# Patient Record
Sex: MALE | Race: WHITE | NOT HISPANIC OR LATINO
[De-identification: names, ages, dates, MRNs, and addresses within clinical notes are randomized per-mention and may not be internally consistent; named-entity substitution may affect disease eponyms.]

---

## 2022-06-30 PROBLEM — Z00.00 ENCOUNTER FOR PREVENTIVE HEALTH EXAMINATION: Status: ACTIVE | Noted: 2022-06-30

## 2022-07-16 ENCOUNTER — NON-APPOINTMENT (OUTPATIENT)
Age: 72
End: 2022-07-16

## 2022-07-19 ENCOUNTER — OUTPATIENT (OUTPATIENT)
Dept: OUTPATIENT SERVICES | Facility: HOSPITAL | Age: 72
LOS: 1 days | End: 2022-07-19
Payer: MEDICARE

## 2022-07-19 ENCOUNTER — APPOINTMENT (OUTPATIENT)
Dept: RADIOLOGY | Facility: CLINIC | Age: 72
End: 2022-07-19

## 2022-07-19 ENCOUNTER — APPOINTMENT (OUTPATIENT)
Dept: RHEUMATOLOGY | Facility: CLINIC | Age: 72
End: 2022-07-19
Payer: MEDICARE

## 2022-07-19 ENCOUNTER — RESULT REVIEW (OUTPATIENT)
Age: 72
End: 2022-07-19

## 2022-07-19 ENCOUNTER — LABORATORY RESULT (OUTPATIENT)
Age: 72
End: 2022-07-19

## 2022-07-19 ENCOUNTER — TRANSCRIPTION ENCOUNTER (OUTPATIENT)
Age: 72
End: 2022-07-19

## 2022-07-19 VITALS
SYSTOLIC BLOOD PRESSURE: 150 MMHG | TEMPERATURE: 97.7 F | OXYGEN SATURATION: 98 % | DIASTOLIC BLOOD PRESSURE: 81 MMHG | RESPIRATION RATE: 16 BRPM | HEART RATE: 90 BPM | WEIGHT: 168 LBS

## 2022-07-19 DIAGNOSIS — M16.11 UNILATERAL PRIMARY OSTEOARTHRITIS, RIGHT HIP: ICD-10-CM

## 2022-07-19 DIAGNOSIS — K30 FUNCTIONAL DYSPEPSIA: ICD-10-CM

## 2022-07-19 DIAGNOSIS — R21 RASH AND OTHER NONSPECIFIC SKIN ERUPTION: ICD-10-CM

## 2022-07-19 DIAGNOSIS — Z87.891 PERSONAL HISTORY OF NICOTINE DEPENDENCE: ICD-10-CM

## 2022-07-19 DIAGNOSIS — R63.4 ABNORMAL WEIGHT LOSS: ICD-10-CM

## 2022-07-19 PROCEDURE — 73502 X-RAY EXAM HIP UNI 2-3 VIEWS: CPT

## 2022-07-19 PROCEDURE — 73502 X-RAY EXAM HIP UNI 2-3 VIEWS: CPT | Mod: 26,RT

## 2022-07-19 PROCEDURE — 71045 X-RAY EXAM CHEST 1 VIEW: CPT

## 2022-07-19 PROCEDURE — 71045 X-RAY EXAM CHEST 1 VIEW: CPT | Mod: 26

## 2022-07-19 PROCEDURE — 99205 OFFICE O/P NEW HI 60 MIN: CPT

## 2022-07-20 ENCOUNTER — NON-APPOINTMENT (OUTPATIENT)
Age: 72
End: 2022-07-20

## 2022-07-20 PROBLEM — Z87.891 FORMER SMOKER: Status: ACTIVE | Noted: 2022-07-20

## 2022-07-20 PROBLEM — M16.11 ARTHRITIS OF RIGHT HIP: Status: ACTIVE | Noted: 2022-07-19

## 2022-07-20 PROBLEM — R21 RASH: Status: ACTIVE | Noted: 2022-07-19

## 2022-07-20 RX ORDER — ACYCLOVIR 400 MG/1
400 TABLET ORAL
Qty: 45 | Refills: 0 | Status: COMPLETED | COMMUNITY
Start: 2022-06-17

## 2022-07-20 RX ORDER — AMLODIPINE BESYLATE 5 MG/1
5 TABLET ORAL
Qty: 30 | Refills: 0 | Status: ACTIVE | COMMUNITY
Start: 2022-03-02

## 2022-07-21 ENCOUNTER — OUTPATIENT (OUTPATIENT)
Dept: OUTPATIENT SERVICES | Facility: HOSPITAL | Age: 72
LOS: 1 days | End: 2022-07-21
Payer: MEDICARE

## 2022-07-21 ENCOUNTER — APPOINTMENT (OUTPATIENT)
Dept: ULTRASOUND IMAGING | Facility: CLINIC | Age: 72
End: 2022-07-21

## 2022-07-21 DIAGNOSIS — R63.4 ABNORMAL WEIGHT LOSS: ICD-10-CM

## 2022-07-21 DIAGNOSIS — R14.0 ABDOMINAL DISTENSION (GASEOUS): ICD-10-CM

## 2022-07-21 LAB
ALBUMIN SERPL ELPH-MCNC: 4.8 G/DL
ALP BLD-CCNC: 56 U/L
ALT SERPL-CCNC: 12 U/L
AMYLASE/CREAT SERPL: 72 U/L
ANION GAP SERPL CALC-SCNC: 13 MMOL/L
APPEARANCE: CLEAR
AST SERPL-CCNC: 15 U/L
BACTERIA: NEGATIVE
BASOPHILS # BLD AUTO: 0.06 K/UL
BASOPHILS NFR BLD AUTO: 0.9 %
BILIRUB SERPL-MCNC: 0.5 MG/DL
BILIRUBIN URINE: NEGATIVE
BLOOD URINE: NEGATIVE
BUN SERPL-MCNC: 11 MG/DL
CALCIUM SERPL-MCNC: 9.4 MG/DL
CHLORIDE SERPL-SCNC: 104 MMOL/L
CO2 SERPL-SCNC: 25 MMOL/L
COLOR: NORMAL
CREAT SERPL-MCNC: 1.05 MG/DL
CREAT SPEC-SCNC: 65 MG/DL
CREAT/PROT UR: 0.1 RATIO
CRP SERPL-MCNC: <3 MG/L
DEPRECATED KAPPA LC FREE/LAMBDA SER: 1.08 RATIO
EGFR: 76 ML/MIN/1.73M2
EOSINOPHIL # BLD AUTO: 0.03 K/UL
EOSINOPHIL NFR BLD AUTO: 0.4 %
ERYTHROCYTE [SEDIMENTATION RATE] IN BLOOD BY WESTERGREN METHOD: 2 MM/HR
GLUCOSE QUALITATIVE U: NEGATIVE
GLUCOSE SERPL-MCNC: 97 MG/DL
HCT VFR BLD CALC: 43.7 %
HGB BLD-MCNC: 14.1 G/DL
HYALINE CASTS: 0 /LPF
IGA SER QL IEP: 171 MG/DL
IGG SER QL IEP: 902 MG/DL
IGM SER QL IEP: 21 MG/DL
IMM GRANULOCYTES NFR BLD AUTO: 0.3 %
KAPPA LC CSF-MCNC: 1.31 MG/DL
KAPPA LC SERPL-MCNC: 1.42 MG/DL
KETONES URINE: NEGATIVE
LEUKOCYTE ESTERASE URINE: NEGATIVE
LPL SERPL-CCNC: 24 U/L
LYMPHOCYTES # BLD AUTO: 1.77 K/UL
LYMPHOCYTES NFR BLD AUTO: 26.4 %
MAN DIFF?: NORMAL
MCHC RBC-ENTMCNC: 28.7 PG
MCHC RBC-ENTMCNC: 32.3 GM/DL
MCV RBC AUTO: 89 FL
MICROSCOPIC-UA: NORMAL
MONOCYTES # BLD AUTO: 0.49 K/UL
MONOCYTES NFR BLD AUTO: 7.3 %
NEUTROPHILS # BLD AUTO: 4.33 K/UL
NEUTROPHILS NFR BLD AUTO: 64.7 %
NITRITE URINE: NEGATIVE
PH URINE: 5.5
PLATELET # BLD AUTO: 279 K/UL
POTASSIUM SERPL-SCNC: 4.2 MMOL/L
PROT SERPL-MCNC: 6.9 G/DL
PROT UR-MCNC: 4 MG/DL
PROTEIN URINE: NEGATIVE
RBC # BLD: 4.91 M/UL
RBC # FLD: 13.2 %
RED BLOOD CELLS URINE: 0 /HPF
SODIUM SERPL-SCNC: 141 MMOL/L
SPECIFIC GRAVITY URINE: 1.01
SQUAMOUS EPITHELIAL CELLS: 0 /HPF
UROBILINOGEN URINE: NORMAL
WBC # FLD AUTO: 6.7 K/UL
WHITE BLOOD CELLS URINE: 0 /HPF

## 2022-07-21 PROCEDURE — 76700 US EXAM ABDOM COMPLETE: CPT

## 2022-07-21 PROCEDURE — 76700 US EXAM ABDOM COMPLETE: CPT | Mod: 26

## 2022-07-22 PROBLEM — K30: Status: ACTIVE | Noted: 2022-07-22

## 2022-07-26 ENCOUNTER — APPOINTMENT (OUTPATIENT)
Dept: GASTROENTEROLOGY | Facility: CLINIC | Age: 72
End: 2022-07-26

## 2022-07-26 VITALS
TEMPERATURE: 96.9 F | WEIGHT: 158 LBS | HEART RATE: 76 BPM | SYSTOLIC BLOOD PRESSURE: 140 MMHG | DIASTOLIC BLOOD PRESSURE: 76 MMHG | OXYGEN SATURATION: 98 % | BODY MASS INDEX: 23.4 KG/M2 | HEIGHT: 69 IN

## 2022-07-26 DIAGNOSIS — Z80.52 FAMILY HISTORY OF MALIGNANT NEOPLASM OF BLADDER: ICD-10-CM

## 2022-07-26 DIAGNOSIS — I10 ESSENTIAL (PRIMARY) HYPERTENSION: ICD-10-CM

## 2022-07-26 DIAGNOSIS — R10.32 LEFT LOWER QUADRANT PAIN: ICD-10-CM

## 2022-07-26 DIAGNOSIS — K59.00 CONSTIPATION, UNSPECIFIED: ICD-10-CM

## 2022-07-26 PROCEDURE — 99214 OFFICE O/P EST MOD 30 MIN: CPT

## 2022-07-26 NOTE — REVIEW OF SYSTEMS
[Feeling Tired] : feeling tired [Recent Weight Loss (___ Lbs)] : recent [unfilled] ~Ulb weight loss [Negative] : Heme/Lymph

## 2022-07-26 NOTE — REASON FOR VISIT
[FreeTextEntry1] : Anorexia, 10 pound weight loss in a week, lower pelvic and inguinal discomfort some mild constipation

## 2022-07-26 NOTE — CONSULT LETTER
[Dear  ___] : Dear  [unfilled], [Consult Letter:] : I had the pleasure of evaluating your patient, [unfilled]. [Please see my note below.] : Please see my note below. [Consult Closing:] : Thank you very much for allowing me to participate in the care of this patient.  If you have any questions, please do not hesitate to contact me. [Sincerely,] : Sincerely, [FreeTextEntry2] : Angie Weems MD\par [address] [place] [address] [address] [FreeTextEntry3] : Que Rodriguez MD\par

## 2022-07-26 NOTE — ASSESSMENT
[FreeTextEntry1] : Impression\par \par Significant anorexia 1 week\par \par 10 pound weight loss 1 week\par \par Lower abdominal wall really pelvic bilateral discomfort intermittently into the left flank 1 week\par \par No recent upper endoscopy or colonoscopy\par \par Ultrasound shows small hepatic cyst and gallbladder polyp\par \par CAT scan with contrast as an angiogram ordered by rheumatologist and to be done in 2 days\par \par Suggest\par \par Agree with CAT scan\par \par Blood work here today\par \par Upper endoscopy soon as possible\par \par Later colonoscopy\par \par MiraLAX\par \par He has my cell phone number and he can call me anytime we will go to emergency room

## 2022-07-26 NOTE — PHYSICAL EXAM
[General Appearance - Alert] : alert [General Appearance - In No Acute Distress] : in no acute distress [FreeTextEntry1] : Thin pleasant gentleman, no acute distress [Sclera] : the sclera and conjunctiva were normal [Neck Appearance] : the appearance of the neck was normal [Neck Cervical Mass (___cm)] : no neck mass was observed [Jugular Venous Distention Increased] : there was no jugular-venous distention [Auscultation Breath Sounds / Voice Sounds] : lungs were clear to auscultation bilaterally [Apical Impulse] : the apical impulse was normal [Full Pulse] : the pedal pulses are present [Edema] : there was no peripheral edema [Bowel Sounds] : normal bowel sounds [Abdomen Soft] : soft [Abdomen Tenderness] : non-tender [] : no hepato-splenomegaly [Abdomen Mass (___ Cm)] : no abdominal mass palpated [Cervical Lymph Nodes Enlarged Posterior Bilaterally] : posterior cervical [Cervical Lymph Nodes Enlarged Anterior Bilaterally] : anterior cervical [Supraclavicular Lymph Nodes Enlarged Bilaterally] : supraclavicular [Axillary Lymph Nodes Enlarged Bilaterally] : axillary [Femoral Lymph Nodes Enlarged Bilaterally] : femoral [Inguinal Lymph Nodes Enlarged Bilaterally] : inguinal [No CVA Tenderness] : no ~M costovertebral angle tenderness [No Spinal Tenderness] : no spinal tenderness [Abnormal Walk] : normal gait [Nail Clubbing] : no clubbing  or cyanosis of the fingernails [Musculoskeletal - Swelling] : no joint swelling seen [Motor Tone] : muscle strength and tone were normal [Skin Color & Pigmentation] : normal skin color and pigmentation [Skin Turgor] : normal skin turgor [No Focal Deficits] : no focal deficits [Oriented To Time, Place, And Person] : oriented to person, place, and time [Impaired Insight] : insight and judgment were intact [Affect] : the affect was normal

## 2022-07-26 NOTE — HISTORY OF PRESENT ILLNESS
[de-identified] : Angie Weems MD\par [address] [place] [address] [address]\par \par Pleasant 71-year-old gentleman\par \par 1 week significant anorexia with 10 pound weight loss\par \par 1 week of intermittent abdominal really pelvic discomfort, bilaterally, into the inguinal area and occasionally into the left flank area\par \par No fever or chills\par \par Some mild constipation\par \par No blood per rectum\par \par No vomiting\par \par No recent upper endoscopy or colonoscopy\par \par Ultrasound shows hepatic cyst and gallbladder polyp\par \par CAT scan of the abdomen with contrast is scheduled in 2 days\par \par No fever or chills\par \par No family history of any intestinal, hepatobiliary or pancreatic neoplasms

## 2022-07-27 DIAGNOSIS — D50.8 OTHER IRON DEFICIENCY ANEMIAS: ICD-10-CM

## 2022-07-27 LAB
ALBUMIN SERPL ELPH-MCNC: 5 G/DL
ALP BLD-CCNC: 57 U/L
ALT SERPL-CCNC: 12 U/L
ANION GAP SERPL CALC-SCNC: 17 MMOL/L
AST SERPL-CCNC: 21 U/L
BASOPHILS # BLD AUTO: 0.05 K/UL
BASOPHILS NFR BLD AUTO: 0.7 %
BILIRUB SERPL-MCNC: 0.8 MG/DL
BUN SERPL-MCNC: 12 MG/DL
CALCIUM SERPL-MCNC: 10 MG/DL
CANCER AG19-9 SERPL-ACNC: 18 U/ML
CEA SERPL-MCNC: 1.7 NG/ML
CHLORIDE SERPL-SCNC: 101 MMOL/L
CO2 SERPL-SCNC: 22 MMOL/L
CREAT SERPL-MCNC: 1.01 MG/DL
CRP SERPL-MCNC: <3 MG/L
EGFR: 80 ML/MIN/1.73M2
EOSINOPHIL # BLD AUTO: 0.02 K/UL
EOSINOPHIL NFR BLD AUTO: 0.3 %
ERYTHROCYTE [SEDIMENTATION RATE] IN BLOOD BY WESTERGREN METHOD: 6 MM/HR
FERRITIN SERPL-MCNC: 233 NG/ML
GLUCOSE SERPL-MCNC: 86 MG/DL
HCT VFR BLD CALC: 45.9 %
HGB BLD-MCNC: 14.9 G/DL
IMM GRANULOCYTES NFR BLD AUTO: 0.1 %
IRON SATN MFR SERPL: 13 %
IRON SERPL-MCNC: 47 UG/DL
LPL SERPL-CCNC: 16 U/L
LYMPHOCYTES # BLD AUTO: 1.68 K/UL
LYMPHOCYTES NFR BLD AUTO: 25 %
MAN DIFF?: NORMAL
MCHC RBC-ENTMCNC: 28.9 PG
MCHC RBC-ENTMCNC: 32.5 GM/DL
MCV RBC AUTO: 89.1 FL
MONOCYTES # BLD AUTO: 0.45 K/UL
MONOCYTES NFR BLD AUTO: 6.7 %
NEUTROPHILS # BLD AUTO: 4.5 K/UL
NEUTROPHILS NFR BLD AUTO: 67.2 %
PLATELET # BLD AUTO: 287 K/UL
POTASSIUM SERPL-SCNC: 4.4 MMOL/L
PROT SERPL-MCNC: 7.2 G/DL
RBC # BLD: 5.15 M/UL
RBC # FLD: 13.2 %
SODIUM SERPL-SCNC: 139 MMOL/L
TIBC SERPL-MCNC: 357 UG/DL
UIBC SERPL-MCNC: 309 UG/DL
WBC # FLD AUTO: 6.71 K/UL

## 2022-07-28 ENCOUNTER — NON-APPOINTMENT (OUTPATIENT)
Age: 72
End: 2022-07-28

## 2022-07-28 ENCOUNTER — APPOINTMENT (OUTPATIENT)
Dept: CT IMAGING | Facility: IMAGING CENTER | Age: 72
End: 2022-07-28

## 2022-07-28 ENCOUNTER — OUTPATIENT (OUTPATIENT)
Dept: OUTPATIENT SERVICES | Facility: HOSPITAL | Age: 72
LOS: 1 days | End: 2022-07-28
Payer: MEDICARE

## 2022-07-28 DIAGNOSIS — K30 FUNCTIONAL DYSPEPSIA: ICD-10-CM

## 2022-07-28 DIAGNOSIS — R14.0 ABDOMINAL DISTENSION (GASEOUS): ICD-10-CM

## 2022-07-28 DIAGNOSIS — Z00.8 ENCOUNTER FOR OTHER GENERAL EXAMINATION: ICD-10-CM

## 2022-07-28 PROCEDURE — 74175 CTA ABDOMEN W/CONTRAST: CPT | Mod: 26

## 2022-07-28 PROCEDURE — 74175 CTA ABDOMEN W/CONTRAST: CPT

## 2022-07-30 ENCOUNTER — NON-APPOINTMENT (OUTPATIENT)
Age: 72
End: 2022-07-30

## 2022-08-01 ENCOUNTER — APPOINTMENT (OUTPATIENT)
Dept: GASTROENTEROLOGY | Facility: AMBULATORY MEDICAL SERVICES | Age: 72
End: 2022-08-01

## 2022-08-02 ENCOUNTER — NON-APPOINTMENT (OUTPATIENT)
Age: 72
End: 2022-08-02

## 2022-08-03 ENCOUNTER — APPOINTMENT (OUTPATIENT)
Dept: GASTROENTEROLOGY | Facility: CLINIC | Age: 72
End: 2022-08-03

## 2022-08-03 ENCOUNTER — APPOINTMENT (OUTPATIENT)
Dept: GASTROENTEROLOGY | Facility: CLINIC | Age: 72
End: 2022-08-03
Payer: MEDICARE

## 2022-08-03 VITALS
HEIGHT: 69 IN | HEART RATE: 77 BPM | DIASTOLIC BLOOD PRESSURE: 75 MMHG | TEMPERATURE: 97.7 F | OXYGEN SATURATION: 98 % | SYSTOLIC BLOOD PRESSURE: 132 MMHG | WEIGHT: 157.2 LBS | BODY MASS INDEX: 23.28 KG/M2

## 2022-08-03 DIAGNOSIS — Z20.822 ENCOUNTER FOR PREPROCEDURAL LABORATORY EXAMINATION: ICD-10-CM

## 2022-08-03 DIAGNOSIS — Z01.812 ENCOUNTER FOR PREPROCEDURAL LABORATORY EXAMINATION: ICD-10-CM

## 2022-08-03 DIAGNOSIS — R93.5 ABNORMAL FINDINGS ON DIAGNOSTIC IMAGING OF OTHER ABDOMINAL REGIONS, INCLUDING RETROPERITONEUM: ICD-10-CM

## 2022-08-03 DIAGNOSIS — R14.0 ABDOMINAL DISTENSION (GASEOUS): ICD-10-CM

## 2022-08-03 DIAGNOSIS — R10.9 UNSPECIFIED ABDOMINAL PAIN: ICD-10-CM

## 2022-08-03 DIAGNOSIS — R63.4 ABNORMAL WEIGHT LOSS: ICD-10-CM

## 2022-08-03 DIAGNOSIS — R63.0 ANOREXIA: ICD-10-CM

## 2022-08-03 DIAGNOSIS — Z86.79 PERSONAL HISTORY OF OTHER DISEASES OF THE CIRCULATORY SYSTEM: ICD-10-CM

## 2022-08-03 DIAGNOSIS — K59.00 CONSTIPATION, UNSPECIFIED: ICD-10-CM

## 2022-08-03 PROCEDURE — 36415 COLL VENOUS BLD VENIPUNCTURE: CPT

## 2022-08-03 PROCEDURE — 99215 OFFICE O/P EST HI 40 MIN: CPT | Mod: 25

## 2022-08-03 RX ORDER — MULTIVIT-MIN/FOLIC/VIT K/LYCOP 400-300MCG
1000 TABLET ORAL
Refills: 0 | Status: ACTIVE | COMMUNITY

## 2022-08-03 RX ORDER — ESCITALOPRAM OXALATE 5 MG/1
5 TABLET ORAL
Qty: 30 | Refills: 0 | Status: DISCONTINUED | COMMUNITY
Start: 2022-06-29 | End: 2022-08-03

## 2022-08-03 RX ORDER — HYDROCORTISONE 25 MG/G
2.5 CREAM TOPICAL
Qty: 30 | Refills: 0 | Status: DISCONTINUED | COMMUNITY
Start: 2022-07-11 | End: 2022-08-03

## 2022-08-03 RX ORDER — TRIAMCINOLONE ACETONIDE 1 MG/G
0.1 OINTMENT TOPICAL
Qty: 80 | Refills: 0 | Status: DISCONTINUED | COMMUNITY
Start: 2022-07-13 | End: 2022-08-03

## 2022-08-03 RX ORDER — SODIUM SULFATE, POTASSIUM SULFATE, MAGNESIUM SULFATE 17.5; 3.13; 1.6 G/ML; G/ML; G/ML
17.5-3.13-1.6 SOLUTION, CONCENTRATE ORAL
Qty: 1 | Refills: 0 | Status: DISCONTINUED | COMMUNITY
Start: 2022-07-27 | End: 2022-08-03

## 2022-08-03 RX ORDER — HYDROXYZINE HYDROCHLORIDE 25 MG/1
25 TABLET ORAL
Qty: 30 | Refills: 0 | Status: DISCONTINUED | COMMUNITY
Start: 2022-03-02 | End: 2022-08-03

## 2022-08-04 ENCOUNTER — APPOINTMENT (OUTPATIENT)
Dept: VASCULAR SURGERY | Facility: CLINIC | Age: 72
End: 2022-08-04

## 2022-08-04 VITALS
SYSTOLIC BLOOD PRESSURE: 117 MMHG | BODY MASS INDEX: 23.25 KG/M2 | WEIGHT: 157 LBS | DIASTOLIC BLOOD PRESSURE: 78 MMHG | HEIGHT: 69 IN | HEART RATE: 71 BPM

## 2022-08-04 PROCEDURE — 99204 OFFICE O/P NEW MOD 45 MIN: CPT

## 2022-08-04 NOTE — ASSESSMENT
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  The DICK is small and difficult to evaluate.  The SMA is without stenosis.  At this time I doubt ischemia as a cause of the patient's symptoms.  Patient should continue and follow through with his endoscopy and colonoscopy.  He may follow-up as needed.

## 2022-08-04 NOTE — HISTORY OF PRESENT ILLNESS
[FreeTextEntry1] : 71-year-old gentleman with no significant medical history presents to the office complaining of intermittent abdominal pain.  Patient states several months ago he developed a rash around the ankles.  The presumed diagnosis was vasculitis and notes that it has now resolved.  In addition.  Patient began complaining of anorexia along with weight loss.  He states that the pain is in his abdomen and radiates to the flanks and groins.  Patient has not been vomiting.  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  He presents for evaluation.

## 2022-08-04 NOTE — PHYSICAL EXAM
[Normal Breath Sounds] : Normal breath sounds [Normal Rate and Rhythm] : normal rate and rhythm [2+] : left 2+ [Varicose Veins Of Lower Extremities] : present [Varicose Veins Of The Right Leg] : of the right leg [Ankle Swelling On The Left] : moderate [No Rash or Lesion] : No rash or lesion [Alert] : alert [Calm] : calm [JVD] : no jugular venous distention  [Abdomen Tenderness] : ~T ~M No abdominal tenderness [de-identified] : Appears well

## 2022-08-06 ENCOUNTER — EMERGENCY (EMERGENCY)
Facility: HOSPITAL | Age: 72
LOS: 1 days | Discharge: HOME | End: 2022-08-06
Attending: STUDENT IN AN ORGANIZED HEALTH CARE EDUCATION/TRAINING PROGRAM | Admitting: STUDENT IN AN ORGANIZED HEALTH CARE EDUCATION/TRAINING PROGRAM
Payer: MEDICARE

## 2022-08-06 ENCOUNTER — NON-APPOINTMENT (OUTPATIENT)
Age: 72
End: 2022-08-06

## 2022-08-06 VITALS
RESPIRATION RATE: 18 BRPM | DIASTOLIC BLOOD PRESSURE: 71 MMHG | SYSTOLIC BLOOD PRESSURE: 136 MMHG | WEIGHT: 154.98 LBS | TEMPERATURE: 98 F | OXYGEN SATURATION: 98 % | HEIGHT: 69 IN | HEART RATE: 79 BPM

## 2022-08-06 DIAGNOSIS — D69.0 ALLERGIC PURPURA: ICD-10-CM

## 2022-08-06 DIAGNOSIS — R10.30 LOWER ABDOMINAL PAIN, UNSPECIFIED: ICD-10-CM

## 2022-08-06 LAB
ALBUMIN SERPL ELPH-MCNC: 4.6 G/DL — SIGNIFICANT CHANGE UP (ref 3.3–5)
ALP SERPL-CCNC: 49 U/L — SIGNIFICANT CHANGE UP (ref 40–120)
ALT FLD-CCNC: 12 U/L — SIGNIFICANT CHANGE UP (ref 10–45)
ANION GAP SERPL CALC-SCNC: 10 MMOL/L — SIGNIFICANT CHANGE UP (ref 5–17)
APPEARANCE UR: CLEAR — SIGNIFICANT CHANGE UP
APTT BLD: 35.9 SEC — HIGH (ref 27.5–35.5)
AST SERPL-CCNC: 17 U/L — SIGNIFICANT CHANGE UP (ref 10–40)
BACTERIA # UR AUTO: SIGNIFICANT CHANGE UP /HPF
BASOPHILS # BLD AUTO: 0.05 K/UL — SIGNIFICANT CHANGE UP (ref 0–0.2)
BASOPHILS NFR BLD AUTO: 0.8 % — SIGNIFICANT CHANGE UP (ref 0–2)
BILIRUB SERPL-MCNC: 0.7 MG/DL — SIGNIFICANT CHANGE UP (ref 0.2–1.2)
BILIRUB UR-MCNC: NEGATIVE — SIGNIFICANT CHANGE UP
BLD GP AB SCN SERPL QL: NEGATIVE — SIGNIFICANT CHANGE UP
BUN SERPL-MCNC: 8 MG/DL — SIGNIFICANT CHANGE UP (ref 7–23)
CALCIUM SERPL-MCNC: 9.6 MG/DL — SIGNIFICANT CHANGE UP (ref 8.4–10.5)
CHLORIDE SERPL-SCNC: 102 MMOL/L — SIGNIFICANT CHANGE UP (ref 96–108)
CO2 SERPL-SCNC: 27 MMOL/L — SIGNIFICANT CHANGE UP (ref 22–31)
COLOR SPEC: YELLOW — SIGNIFICANT CHANGE UP
COMMENT - URINE: SIGNIFICANT CHANGE UP
CREAT SERPL-MCNC: 0.89 MG/DL — SIGNIFICANT CHANGE UP (ref 0.5–1.3)
CRP SERPL-MCNC: <3 MG/L — SIGNIFICANT CHANGE UP (ref 0–4)
DIFF PNL FLD: ABNORMAL
EGFR: 92 ML/MIN/1.73M2 — SIGNIFICANT CHANGE UP
EOSINOPHIL # BLD AUTO: 0.04 K/UL — SIGNIFICANT CHANGE UP (ref 0–0.5)
EOSINOPHIL NFR BLD AUTO: 0.7 % — SIGNIFICANT CHANGE UP (ref 0–6)
EPI CELLS # UR: SIGNIFICANT CHANGE UP /HPF (ref 0–5)
ERYTHROCYTE [SEDIMENTATION RATE] IN BLOOD: 5 MM/HR — SIGNIFICANT CHANGE UP
FIBRINOGEN PPP-MCNC: 357 MG/DL — SIGNIFICANT CHANGE UP (ref 258–438)
GLUCOSE SERPL-MCNC: 96 MG/DL — SIGNIFICANT CHANGE UP (ref 70–99)
GLUCOSE UR QL: NEGATIVE — SIGNIFICANT CHANGE UP
HCT VFR BLD CALC: 42.6 % — SIGNIFICANT CHANGE UP (ref 39–50)
HGB BLD-MCNC: 14.3 G/DL — SIGNIFICANT CHANGE UP (ref 13–17)
HYALINE CASTS # UR AUTO: SIGNIFICANT CHANGE UP /LPF (ref 0–2)
IMM GRANULOCYTES NFR BLD AUTO: 0.2 % — SIGNIFICANT CHANGE UP (ref 0–1.5)
INR BLD: 1.02 — SIGNIFICANT CHANGE UP (ref 0.88–1.16)
KETONES UR-MCNC: 15 MG/DL
LEUKOCYTE ESTERASE UR-ACNC: NEGATIVE — SIGNIFICANT CHANGE UP
LYMPHOCYTES # BLD AUTO: 1.65 K/UL — SIGNIFICANT CHANGE UP (ref 1–3.3)
LYMPHOCYTES # BLD AUTO: 27 % — SIGNIFICANT CHANGE UP (ref 13–44)
MCHC RBC-ENTMCNC: 28.1 PG — SIGNIFICANT CHANGE UP (ref 27–34)
MCHC RBC-ENTMCNC: 33.6 GM/DL — SIGNIFICANT CHANGE UP (ref 32–36)
MCV RBC AUTO: 83.9 FL — SIGNIFICANT CHANGE UP (ref 80–100)
MONOCYTES # BLD AUTO: 0.4 K/UL — SIGNIFICANT CHANGE UP (ref 0–0.9)
MONOCYTES NFR BLD AUTO: 6.5 % — SIGNIFICANT CHANGE UP (ref 2–14)
NEUTROPHILS # BLD AUTO: 3.97 K/UL — SIGNIFICANT CHANGE UP (ref 1.8–7.4)
NEUTROPHILS NFR BLD AUTO: 64.8 % — SIGNIFICANT CHANGE UP (ref 43–77)
NITRITE UR-MCNC: NEGATIVE — SIGNIFICANT CHANGE UP
NRBC # BLD: 0 /100 WBCS — SIGNIFICANT CHANGE UP (ref 0–0)
PH UR: 5.5 — SIGNIFICANT CHANGE UP (ref 5–8)
PLATELET # BLD AUTO: 247 K/UL — SIGNIFICANT CHANGE UP (ref 150–400)
POTASSIUM SERPL-MCNC: 3.8 MMOL/L — SIGNIFICANT CHANGE UP (ref 3.5–5.3)
POTASSIUM SERPL-SCNC: 3.8 MMOL/L — SIGNIFICANT CHANGE UP (ref 3.5–5.3)
PROT SERPL-MCNC: 6.9 G/DL — SIGNIFICANT CHANGE UP (ref 6–8.3)
PROT UR-MCNC: NEGATIVE MG/DL — SIGNIFICANT CHANGE UP
PROTHROM AB SERPL-ACNC: 12.1 SEC — SIGNIFICANT CHANGE UP (ref 10.5–13.4)
RBC # BLD: 5.08 M/UL — SIGNIFICANT CHANGE UP (ref 4.2–5.8)
RBC # FLD: 13 % — SIGNIFICANT CHANGE UP (ref 10.3–14.5)
RBC CASTS # UR COMP ASSIST: < 5 /HPF — SIGNIFICANT CHANGE UP
RH IG SCN BLD-IMP: NEGATIVE — SIGNIFICANT CHANGE UP
SODIUM SERPL-SCNC: 139 MMOL/L — SIGNIFICANT CHANGE UP (ref 135–145)
SP GR SPEC: 1.02 — SIGNIFICANT CHANGE UP (ref 1–1.03)
UROBILINOGEN FLD QL: 0.2 E.U./DL — SIGNIFICANT CHANGE UP
WBC # BLD: 6.12 K/UL — SIGNIFICANT CHANGE UP (ref 3.8–10.5)
WBC # FLD AUTO: 6.12 K/UL — SIGNIFICANT CHANGE UP (ref 3.8–10.5)
WBC UR QL: < 5 /HPF — SIGNIFICANT CHANGE UP

## 2022-08-06 PROCEDURE — 85379 FIBRIN DEGRADATION QUANT: CPT

## 2022-08-06 PROCEDURE — 36415 COLL VENOUS BLD VENIPUNCTURE: CPT

## 2022-08-06 PROCEDURE — 86140 C-REACTIVE PROTEIN: CPT

## 2022-08-06 PROCEDURE — 99284 EMERGENCY DEPT VISIT MOD MDM: CPT

## 2022-08-06 PROCEDURE — 80053 COMPREHEN METABOLIC PANEL: CPT

## 2022-08-06 PROCEDURE — 86850 RBC ANTIBODY SCREEN: CPT

## 2022-08-06 PROCEDURE — 81001 URINALYSIS AUTO W/SCOPE: CPT

## 2022-08-06 PROCEDURE — 85730 THROMBOPLASTIN TIME PARTIAL: CPT

## 2022-08-06 PROCEDURE — 99283 EMERGENCY DEPT VISIT LOW MDM: CPT

## 2022-08-06 PROCEDURE — 86900 BLOOD TYPING SEROLOGIC ABO: CPT

## 2022-08-06 PROCEDURE — 85384 FIBRINOGEN ACTIVITY: CPT

## 2022-08-06 PROCEDURE — 85652 RBC SED RATE AUTOMATED: CPT

## 2022-08-06 PROCEDURE — 85610 PROTHROMBIN TIME: CPT

## 2022-08-06 PROCEDURE — 86901 BLOOD TYPING SEROLOGIC RH(D): CPT

## 2022-08-06 PROCEDURE — 85025 COMPLETE CBC W/AUTO DIFF WBC: CPT

## 2022-08-06 NOTE — ED ADULT TRIAGE NOTE - CHIEF COMPLAINT QUOTE
BIBEMS co R arm bruising beginning this morning. Reports he had vasculitis 1 month ago. Denies injury or trauma, numbness, tingling. takes 81mg ASA qd.

## 2022-08-06 NOTE — ED PROVIDER NOTE - CLINICAL SUMMARY MEDICAL DECISION MAKING FREE TEXT BOX
Atraumatic palpable purpuric lesion on forearm for 1 day. No systemic symptoms indicating severe vasculitis. Pt takes aspirin, no AC.  Will w/u for vasculidities  HDS, well-appearing, no signs or symptoms of hemorrhage  Labs: CBC, CMP, PT/PTT, ESR, CRP, D-dimer, fibrinogen, T/S, UA

## 2022-08-06 NOTE — ED PROVIDER NOTE - PATIENT PORTAL LINK FT
You can access the FollowMyHealth Patient Portal offered by [facility] by registering at the following website: [email or web address]. By joining [facility]’s FollowMyHealth portal, you will also be able to view your health information using other applications (apps) compatible with our system.

## 2022-08-06 NOTE — ED PROVIDER NOTE - NSFOLLOWUPINSTRUCTIONS_ED_ALL_ED_FT
Follow up with your primary medical doctor as soon as possible.    Return to the emergency department if your symptoms worsen or if you develop new symptoms.      Purpura    WHAT YOU NEED TO KNOW:    Purpura are purple or red spots on the skin or mucus membranes. Purpura appear when blood leaks from blood vessels and collects under the skin or mucus membrane. Purpura may be caused by any condition that causes abnormal bleeding. Treatment for purpura depends on what has caused the purpura.     DISCHARGE INSTRUCTIONS:    Call 911 for any of the following:   •You have any of the following signs of a heart attack: ?Squeezing, pressure, or pain in your chest      ?You may also have any of the following: ?Discomfort or pain in your back, neck, jaw, stomach, or arm      ?Shortness of breath      ?Nausea or vomiting      ?Lightheadedness or a sudden cold sweat        •You have any of the following signs of a stroke: ?Numbness or drooping on one side of your face       ?Weakness in an arm or leg      ?Confusion or difficulty speaking      ?Dizziness, a severe headache, or vision loss        Seek care immediately if:   •You have bleeding that does not stop or a bruise that suddenly gets bigger.       •You vomit blood or material that looks like coffee grounds.       •Your arm or leg looks bigger than normal and feels warm, tender, or painful.       •You suddenly feel lightheaded, dizzy, or weak.      Contact your healthcare provider if:   •You have bleeding from your gums, mouth, or nose.       •You have irregular or heavy menstrual bleeding.       •You have blood in your urine or bowel movement.       •You see more bruises or red or purple spots on your skin.       •You have questions or concerns about your condition or care.      Self-care:   •Do not take NSAIDs, aspirin, or blood thinner medicine. These medicines can make purpura worse. Ask your healthcare provider how long you need to stop these medicines.       •Protect your body from injury. Cuts or scrapes may cause bleeding that is difficult to control. Use an electric shaver. Wear gloves when you wash the dishes or garden. Be careful when you use knives or other sharp items. Always wear a seatbelt.       •Do not play contact sports. Contact sports such as football or boxing may cause injury or bleeding that is difficult to control. Ask your healthcare provider what activities are safe for you to do.       •Control bleeding. Apply firm, steady pressure to cuts or scrapes. If possible, elevate the area above the level of your heart. If your nose bleeds, pinch the upper part of your nose and hold a tissue at the opening. Do this until the bleeding stops.       Follow up with your healthcare provider as directed: You may need to return for more tests. Write down your questions so you remember to ask them during your visits.

## 2022-08-06 NOTE — ED ADULT NURSE NOTE - OBJECTIVE STATEMENT
Patient presents to the ED complaining of a red rash to the right forearm since this morning, Patient reports that he woke up with it. Denies any recent travel or use of new products. Reports that it is not painful or itchy. Hx of HTN.

## 2022-08-06 NOTE — ED PROVIDER NOTE - PROGRESS NOTE DETAILS
Pt has no fever, arthralgias, sob, cough, hemoptysis, or other systemic symptoms - does not appear to have severe vasculitis based on initial assessment.  No pulmonary symptoms or renal insufficiency seen on labs/UA, low suspicion on granulomatosis with polyangiitis. D-dimer neg, pt does not have DIC. Plt normal, trace hematuria w no proteinuria, adult patient; pt unlikely to have HSP, ITP, TTP, HUS. No known heparin use.  Consider SLE - will recommend f/u with pts rhematologist. Pt has no fever, arthralgias, sob, cough, hemoptysis, or other systemic symptoms - does not appear to have severe vasculitis based on initial assessment.  No pulmonary symptoms or renal insufficiency seen on labs/UA, low suspicion on granulomatosis with polyangiitis. D-dimer neg, pt does not have DIC. Plt normal, trace hematuria w no proteinuria, adult patient; pt unlikely to have HSP, ITP, TTP, HUS. No known heparin use.  Consider SLE - will recommend f/u with pts rhematologist.  Instructed to DC ASA use

## 2022-08-06 NOTE — ED PROVIDER NOTE - NS ED ROS FT
CONSTITUTIONAL: No fever, no chills, no fatigue  EYES: No eye redness, no visual changes  ENT: No ear pain, no sore throat  CARDIOVASCULAR: No chest pain, no palpitations  RESPIRATORY: No cough, no SOB  GI: +abdominal pain, no nausea, no vomiting, no constipation, no diarrhea  GENITOURINARY: No dysuria, no frequency, no hematuria  MUSCULOSKELETAL: No backpain, no joint pain, no myalgias  SKIN: +rash, no peripheral edema  NEURO: No headache, no confusion    ALL OTHER SYSTEMS NEGATIVE.

## 2022-08-06 NOTE — ED PROVIDER NOTE - PHYSICAL EXAMINATION
CONSTITUTIONAL: Non-toxic; in no apparent distress  HEAD: Normocephalic; atraumatic  EYES: PERRL; EOM intact   ENMT: External appears normal  NECK: Supple; non-tender  CARD: Normal S1, S2; no murmurs, rubs, or gallops  RESP: Normal chest excursion with respiration; breath sounds clear and equal bilaterally  ABD: Soft, non-distended; non-tender  EXT: Normal ROM in all four extremities; non-tender to palpation  SKIN: Warm, dry, + ~5 cm jair area of palpable purpura on R forearm with several surrounding small <1 cm purpuric spots. Non-ttp, non-blanching.  NEURO:  No focal neurological deficiencies.

## 2022-08-06 NOTE — ED PROVIDER NOTE - OBJECTIVE STATEMENT
70 yo M w PMH HTN p/w R forearm discoloration since this morning. Discoloration is area of ~5 cm diameter dark red colored raised spot. Pt denies trauma or burn to area. No fever, pain, paresthesia, hematuria, flank pain, sob, cough, hemoptysis cp, nodules, vision changes, or joint pain  Pt states that 1 mo ago he developed small red spots on dorsum of feel BL that were different in appearance, not coalesced, and was treated at [facility] with topical medication, no dx of vasculitis.  Pt also c/o chronic lower abd pain for >1 mo, no reported BRBPR or melena. 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 He recently followed the results with a vascular surgeon at [facility] who said there is nothing to do at this time.

## 2022-08-07 PROBLEM — R93.5 ABNORMAL CT OF THE ABDOMEN: Status: ACTIVE | Noted: 2022-08-07

## 2022-08-07 NOTE — ASSESSMENT
[FreeTextEntry1] : Patient with recent abdominal pain and distention with decreased appetite, dry heaves, constipation, and the reflux sensation.  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  The patient has had significant weight loss.\par \par An EGD and colonoscopy have been scheduled. The risks, benefits, alternatives, and limitations of the procedures, including the possibility of missed lesions, were explained.\par \par Patient was advised to start taking MiraLAX 17 g in 8 ounces of water once a day.\par \par Blood work was sent for TFTs and celiac markers.\par \par Patient is to follow-up with a vascular surgeon regarding the CT findings, although I doubt that the stenosis seen is enough to explain the patient's symptoms.\par \par We will repeat an ultrasound in March 2023 to follow-up the gallbladder polyp.

## 2022-08-07 NOTE — REASON FOR VISIT
[Follow-Up: _____] : a [unfilled] follow-up visit [FreeTextEntry1] : Abdominal pain, abdominal distention, constipation, poor appetite, weight loss, gallbladder polyp, abnormal CT scan

## 2022-08-07 NOTE — CONSULT LETTER
[FreeTextEntry1] : Dear Dr. Yvan Weems,\par \par I had the pleasure of seeing your patient ASHLEY SAHA in the office today.  My office note is attached. PLEASE READ THE "ASSESSMENT" SECTION OF THE NOTE TO SEE MY IMPRESSION AND PLAN.\par \par Thank you very much for allowing me to participate in the care of your patient.\par \par Sincerely,\par \par Christopher Escobar M.D., FACG, FACP\par Director, Celiac Program at [facility]\par  of Medicine, [facility] [employer] at [facility] Adjunct  of Medicine, [facility] Practice Director, [facility] - Gastroenterology at [place]\[facility] [address]\[place] [address]\par Tel: [phone]\par Email: [email or web address]\par \par \par The attached note has been created using a voice recognition system (Dragon).  There may be some misspellings and typos.  Please call my office if you have any issues or questions.

## 2022-08-07 NOTE — HISTORY OF PRESENT ILLNESS
[FreeTextEntry1] : The patient has been seeing Dr. Rodriguez but wishes to see me instead.  I spent some time reviewing the history and evaluations.  The patient states that he was well until July 11, 2022 when he had spots on both feet which have since resolved.  He was at first treated with hydrocortisone cream and was diagnosed visually with Henoch-Schönlein purpura although this diagnosis is in question.  He then developed pain in his bilateral groins with abdominal distention.  He notes decreased appetite with some nausea and had dry heaves 1 night.  He denies vomiting, heartburn, dysphagia.  He does note a reflux sensation.  His bowel movements are coming out and small amy every 2 days.  He denies melena or bright red blood per rectum.  Of note, the patient has lost 15 pounds over the past 3 weeks.\par \par We reviewed the evaluations done thus far.  Blood work on July 26 was net normal other than a 13% iron saturation.  An abdominal ultrasound performed on July 22, 2022 showed a 2.7 cm benign septated hepatic cyst and a 5 mm gallbladder polyp.  Follow-up ultrasound was recommended in 6 to 12 months.  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  Additionally, he has a history of H. pylori in the past.\par \par  The patient has not been admitted to the hospital in the past year and denies any cardiac issues.

## 2022-08-09 LAB
ENDOMYSIUM IGA SER QL: NEGATIVE
ENDOMYSIUM IGA TITR SER: NORMAL
GLIADIN IGA SER QL: <5 UNITS
GLIADIN IGG SER QL: <5 UNITS
GLIADIN PEPTIDE IGA SER-ACNC: NEGATIVE
GLIADIN PEPTIDE IGG SER-ACNC: NEGATIVE
IGA SER QL IEP: 174 MG/DL
SARS-COV-2 N GENE NPH QL NAA+PROBE: NOT DETECTED
TSH SERPL-ACNC: 0.52 UIU/ML
TTG IGA SER IA-ACNC: <1.2 U/ML
TTG IGA SER-ACNC: NEGATIVE
TTG IGG SER IA-ACNC: <1.2 U/ML
TTG IGG SER IA-ACNC: NEGATIVE

## 2022-08-10 ENCOUNTER — NON-APPOINTMENT (OUTPATIENT)
Age: 72
End: 2022-08-10

## 2022-08-12 ENCOUNTER — APPOINTMENT (OUTPATIENT)
Dept: GASTROENTEROLOGY | Facility: AMBULATORY MEDICAL SERVICES | Age: 72
End: 2022-08-12

## 2022-08-12 PROCEDURE — 45378 DIAGNOSTIC COLONOSCOPY: CPT | Mod: PT

## 2022-08-12 PROCEDURE — 43239 EGD BIOPSY SINGLE/MULTIPLE: CPT | Mod: 59

## 2022-08-23 ENCOUNTER — APPOINTMENT (OUTPATIENT)
Dept: DERMATOLOGY | Facility: CLINIC | Age: 72
End: 2022-08-23

## 2022-11-17 ENCOUNTER — APPOINTMENT (OUTPATIENT)
Dept: GASTROENTEROLOGY | Facility: CLINIC | Age: 72
End: 2022-11-17
Payer: MEDICARE

## 2022-11-17 VITALS
BODY MASS INDEX: 23.11 KG/M2 | HEART RATE: 74 BPM | WEIGHT: 156 LBS | OXYGEN SATURATION: 98 % | TEMPERATURE: 96.9 F | DIASTOLIC BLOOD PRESSURE: 70 MMHG | HEIGHT: 69 IN | SYSTOLIC BLOOD PRESSURE: 120 MMHG

## 2022-11-17 DIAGNOSIS — R14.2 ERUCTATION: ICD-10-CM

## 2022-11-17 DIAGNOSIS — R63.0 ANOREXIA: ICD-10-CM

## 2022-11-17 DIAGNOSIS — K57.30 DIVERTICULOSIS OF LARGE INTESTINE W/OUT PERFORATION OR ABSCESS W/OUT BLEEDING: ICD-10-CM

## 2022-11-17 DIAGNOSIS — K64.8 OTHER HEMORRHOIDS: ICD-10-CM

## 2022-11-17 PROCEDURE — 99214 OFFICE O/P EST MOD 30 MIN: CPT

## 2022-11-17 RX ORDER — PANTOPRAZOLE 40 MG/1
40 TABLET, DELAYED RELEASE ORAL
Qty: 90 | Refills: 1 | Status: ACTIVE | COMMUNITY
Start: 2022-11-17 | End: 1900-01-01

## 2022-11-20 NOTE — ASSESSMENT
[FreeTextEntry1] : Patient status post EGD and colonoscopy only significant for diverticulosis.  He had felt better but now again complains of increased belching and decreased appetite.  He feels that this may be related to stress.\par \par The patient was started on pantoprazole 40 mg a day to see if this leads to improvement in his symptoms.  He was also advised to avoid Advil.\par \par Patient was sent for a HIDA scan to rule out chronic cholecystitis/biliary dyskinesia.\par \par Information was given to the patient regarding diverticulosis and a high-fiber diet.\par \par I discussed with the patient the possibility that anxiety is responsible for his symptoms.  He may benefit from an antianxiety agent.  He was advised to speak with his primary care doctor about this or perhaps to see a psychiatrist.\par \par We will repeat an abdominal ultrasound in March 2023 to follow-up the gallbladder polyp.\par \par We will repeat a colonoscopy in 5 to 10 years.\par \par Patient will return to see me in 6 weeks.\par \par \par Plan from 8/3/2022 - Patient with recent abdominal pain and distention with decreased appetite, dry heaves, constipation, and the reflux sensation.  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  The patient has had significant weight loss.\par \par An EGD and colonoscopy have been scheduled. The risks, benefits, alternatives, and limitations of the procedures, including the possibility of missed lesions, were explained.\par \par Patient was advised to start taking MiraLAX 17 g in 8 ounces of water once a day.\par \par Blood work was sent for TFTs and celiac markers.\par \par Patient is to follow-up with a vascular surgeon regarding the CT findings, although I doubt that the stenosis seen is enough to explain the patient's symptoms.\par \par We will repeat an ultrasound in March 2023 to follow-up the gallbladder polyp.

## 2022-11-20 NOTE — CONSULT LETTER
[FreeTextEntry1] : Dear Dr. Yvan Weems,\par \par I had the pleasure of seeing your patient ASHLEY SAHA in the office today.  My office note is attached. PLEASE READ THE "ASSESSMENT" SECTION OF THE NOTE TO SEE MY IMPRESSION AND PLAN.\par \par Thank you very much for allowing me to participate in the care of your patient.\par \par Sincerely,\par \par Christopher Escobar M.D., FACG, FACP\par Director, Celiac Program at [facility]\par  of Medicine, [facility] [employer] at \[facility]\""/[facility] Adjunct  of Medicine, [facility] Practice Director, [facility] - Gastroenterology at [place]\[facility] [address]\[place] [address]\par Tel: [phone]\par Email: [email or web address]\par \par \par The attached note has been created using a voice recognition system (Dragon).  There may be some misspellings and typos.  Please call my office if you have any issues or questions.

## 2022-11-20 NOTE — HISTORY OF PRESENT ILLNESS
[FreeTextEntry1] : We reviewed the evaluations performed since the patient's last visit on August 3, 2022.  Blood work from that day revealed normal celiac markers and TSH.  The patient underwent EGD and colonoscopy on August 12, 2022 performed by Dr. Hinds.  EGD was grossly normal with negative biopsies.  Colonoscopy was significant for moderate sigmoid diverticulosis as well as internal hemorrhoids which are asymptomatic.  The patient was feeling better and had gained weight with increased appetite.  Over the past 2 weeks, however, he has developed increased belching and again decreased appetite.  He feels a "uptight feeling" in his abdomen with stress.  He denies heartburn, dysphagia, abdominal pain.  He has a bowel movement every 1 to 2 days without melena or bright red blood per rectum.  He saw a vascular doctor who did not feel that ischemia is responsible for the patient's symptoms.  Of note, the patient takes Advil almost daily and aspirin every 2 to 3 days.  He has a history of a gallbladder polyp.\par \par \par Note from 8/3/2022 - The patient has been seeing Dr. Rodriguez but wishes to see me instead.  I spent some time reviewing the history and evaluations.  The patient states that he was well until July 11, 2022 when he had spots on both feet which have since resolved.  He was at first treated with hydrocortisone cream and was diagnosed visually with Henoch-Schönlein purpura although this diagnosis is in question.  He then developed pain in his bilateral groins with abdominal distention.  He notes decreased appetite with some nausea and had dry heaves 1 night.  He denies vomiting, heartburn, dysphagia.  He does note a reflux sensation.  His bowel movements are coming out and small amy every 2 days.  He denies melena or bright red blood per rectum.  Of note, the patient has lost 15 pounds over the past 3 weeks.\par \par We reviewed the evaluations done thus far.  Blood work on July 26 was net normal other than a 13% iron saturation.  An abdominal ultrasound performed on July 22, 2022 showed a 2.7 cm benign septated hepatic cyst and a 5 mm gallbladder polyp.  Follow-up ultrasound was recommended in 6 to 12 months.  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  Additionally, he has a history of H. pylori in the past.\par \par  The patient has not been admitted to the hospital in the past year and denies any cardiac issues.

## 2022-11-22 ENCOUNTER — NON-APPOINTMENT (OUTPATIENT)
Age: 72
End: 2022-11-22

## 2022-11-25 LAB — UREA BREATH TEST QL: NEGATIVE

## 2022-11-28 ENCOUNTER — NON-APPOINTMENT (OUTPATIENT)
Age: 72
End: 2022-11-28

## 2023-05-08 ENCOUNTER — NON-APPOINTMENT (OUTPATIENT)
Age: 73
End: 2023-05-08

## 2023-05-11 ENCOUNTER — APPOINTMENT (OUTPATIENT)
Dept: GASTROENTEROLOGY | Facility: CLINIC | Age: 73
End: 2023-05-11
Payer: MEDICARE

## 2023-05-11 VITALS
HEIGHT: 69 IN | DIASTOLIC BLOOD PRESSURE: 90 MMHG | BODY MASS INDEX: 23.55 KG/M2 | SYSTOLIC BLOOD PRESSURE: 138 MMHG | WEIGHT: 159 LBS | TEMPERATURE: 97.3 F

## 2023-05-11 DIAGNOSIS — K82.4 CHOLESTEROLOSIS OF GALLBLADDER: ICD-10-CM

## 2023-05-11 DIAGNOSIS — K64.4 RESIDUAL HEMORRHOIDAL SKIN TAGS: ICD-10-CM

## 2023-05-11 PROCEDURE — 99214 OFFICE O/P EST MOD 30 MIN: CPT

## 2023-05-11 PROCEDURE — 82270 OCCULT BLOOD FECES: CPT

## 2023-05-11 RX ORDER — HYDROCORTISONE 2.5% 25 MG/G
2.5 CREAM TOPICAL
Qty: 1 | Refills: 2 | Status: ACTIVE | COMMUNITY
Start: 2023-05-11 | End: 1900-01-01

## 2023-05-12 NOTE — PHYSICAL EXAM
[General Appearance - In No Acute Distress] : in no acute distress [General Appearance - Alert] : alert [Neck Appearance] : the appearance of the neck was normal [Neck Cervical Mass (___cm)] : no neck mass was observed [Jugular Venous Distention Increased] : there was no jugular-venous distention [Thyroid Diffuse Enlargement] : the thyroid was not enlarged [Thyroid Nodule] : there were no palpable thyroid nodules [Auscultation Breath Sounds / Voice Sounds] : lungs were clear to auscultation bilaterally [Heart Rate And Rhythm] : heart rate was normal and rhythm regular [Heart Sounds] : normal S1 and S2 [Heart Sounds Gallop] : no gallops [Murmurs] : no murmurs [Heart Sounds Pericardial Friction Rub] : no pericardial rub [Edema] : there was no peripheral edema [Bowel Sounds] : normal bowel sounds [Abdomen Soft] : soft [] : no hepato-splenomegaly [Abdomen Tenderness] : non-tender [Abdomen Mass (___ Cm)] : no abdominal mass palpated [No CVA Tenderness] : no ~M costovertebral angle tenderness [No Spinal Tenderness] : no spinal tenderness [Oriented To Time, Place, And Person] : oriented to person, place, and time [Impaired Insight] : insight and judgment were intact [Affect] : the affect was normal [Occult Blood] : negative occult blood [de-identified] : thrombosed external hemorrhoid

## 2023-05-12 NOTE — CONSULT LETTER
[FreeTextEntry1] : Dear Dr. Yvan Weems,\par \par I had the pleasure of seeing your patient ASHLEY SAHA in the office today.  My office note is attached. PLEASE READ THE "ASSESSMENT" SECTION OF THE NOTE TO SEE MY IMPRESSION AND PLAN.\par \par Thank you very much for allowing me to participate in the care of your patient.\par \par Sincerely,\par \par Christopher Escobar M.D., FACG, FACP\par Director, Celiac Program at [facility]\par  of Medicine, [employer] at [facility] Adjunct  of Medicine, [facility] Practice Director, [facility] Gastroenterology at [place]\[facility] [address] [place] [address]\[place] [address]\par Tel: [phone]\par Email: [email or web address] \par \par The attached note has been created using a voice recognition system ([email or web address]).  There may be some misspellings and typos.  Please call my office if you have any issues or questions.

## 2023-06-26 ENCOUNTER — NON-APPOINTMENT (OUTPATIENT)
Age: 73
End: 2023-06-26

## 2023-06-26 ENCOUNTER — APPOINTMENT (OUTPATIENT)
Dept: OPHTHALMOLOGY | Facility: CLINIC | Age: 73
End: 2023-06-26
Payer: MEDICARE

## 2023-06-26 PROCEDURE — 92286 ANT SGM IMG I&R SPECLR MIC: CPT

## 2023-06-26 PROCEDURE — 92004 COMPRE OPH EXAM NEW PT 1/>: CPT

## 2023-07-11 ENCOUNTER — NON-APPOINTMENT (OUTPATIENT)
Age: 73
End: 2023-07-11

## 2023-07-11 ENCOUNTER — APPOINTMENT (OUTPATIENT)
Dept: OPHTHALMOLOGY | Facility: CLINIC | Age: 73
End: 2023-07-11
Payer: MEDICARE

## 2023-07-11 PROCEDURE — 92012 INTRM OPH EXAM EST PATIENT: CPT

## 2023-07-11 PROCEDURE — 92134 CPTRZ OPH DX IMG PST SGM RTA: CPT

## 2023-07-12 ENCOUNTER — APPOINTMENT (OUTPATIENT)
Dept: OPHTHALMOLOGY | Facility: CLINIC | Age: 73
End: 2023-07-12
Payer: MEDICARE

## 2023-07-12 ENCOUNTER — NON-APPOINTMENT (OUTPATIENT)
Age: 73
End: 2023-07-12

## 2023-07-12 PROCEDURE — 92201 OPSCPY EXTND RTA DRAW UNI/BI: CPT

## 2023-07-12 PROCEDURE — 92134 CPTRZ OPH DX IMG PST SGM RTA: CPT

## 2023-07-12 PROCEDURE — 92014 COMPRE OPH EXAM EST PT 1/>: CPT

## 2023-08-10 ENCOUNTER — NON-APPOINTMENT (OUTPATIENT)
Age: 73
End: 2023-08-10

## 2023-08-10 ENCOUNTER — APPOINTMENT (OUTPATIENT)
Dept: OPHTHALMOLOGY | Facility: CLINIC | Age: 73
End: 2023-08-10
Payer: MEDICARE

## 2023-08-10 PROCEDURE — 92012 INTRM OPH EXAM EST PATIENT: CPT

## 2023-08-10 PROCEDURE — 92285 EXTERNAL OCULAR PHOTOGRAPHY: CPT

## 2023-10-12 ENCOUNTER — APPOINTMENT (OUTPATIENT)
Dept: OPHTHALMOLOGY | Facility: CLINIC | Age: 73
End: 2023-10-12
